# Patient Record
Sex: FEMALE | Race: WHITE
[De-identification: names, ages, dates, MRNs, and addresses within clinical notes are randomized per-mention and may not be internally consistent; named-entity substitution may affect disease eponyms.]

---

## 2024-07-24 ENCOUNTER — TRANSCRIBE ORDERS (OUTPATIENT)
Dept: OTHER | Age: 34
End: 2024-07-24

## 2024-07-24 DIAGNOSIS — J34.3 NASAL TURBINATE HYPERTROPHY: ICD-10-CM

## 2024-07-24 DIAGNOSIS — J34.829 NASAL VALVE COLLAPSE: ICD-10-CM

## 2024-07-24 DIAGNOSIS — J34.2 DEVIATED NASAL SEPTUM: Primary | ICD-10-CM

## 2024-09-30 NOTE — PROGRESS NOTES
Facial Plastic and Reconstructive Surgery Consultation  Department of Otolaryngology  [facility]    Re: Liz Yanez    : 1990    MRN: [id number]    {Greeting_ww (Optional):802451}    IMPRESSION & RECOMMENDATIONS  1.) {LEFT/RIGHT CAPS:943863} nasal obstruction from the following anatomic causes: {Nasal_Exam_Findings_ww:439307}  Other pertinent nasal/facial anatomy: {Nasal_Aesthetics_ww:736866}  Outcomes: NOSE score: *** on 24.  Medical Decision Making (MDM): {Problem_Addressed_MDM_LOS-ww:629809}.   The nasal obstruction results from the anatomic abnormalities described above. I recommend a course of medical therapy using steroid nasal sprays. I have asked Ms. Yanez to follow up with me in 4-6 weeks to update me on her response to medical therapy.    If medical treatment proves ineffective, I would recommend nasal surgery to address the anatomic abnormalities described above.    Rhinoplasty is a medically necessary component of the planned surgical treatment plan to address the components of her nasal obstruction caused by the external nasal deformity.     She has undergone a course of medical treatment and felt minimal to no benefit from this therapy. I recommend the nasal surgery outlined in the care checklist to address her anatomic abnormalities. The patient agrees with this plan and elects to proceed with surgery. We have initiated surgery scheduling.     There are no clear anatomic causes for obstruction found on exam which leads to the consideration of non-anatomic causes for nasal congestion like allergies.    Since she is doing so well on medical therapy, I recommend continuing with medical treatment for up to 6 months and then taper off the nasal sprays. If she has recurrent nasal obstruction and does not want to resume medical therapy, we could reevaluate for surgical treatment at that time. The patient agrees with this plan and will contact us if the medicines become ineffective or if she  wishes to consider surgery further.    For children: We discussed the option of nasal surgery to address her severe obstruction; however, the surgery could effect her facial growth centers. The patient is *** compared to other family members, has an immature facial structure compared to *** and will likely have more facial growth. Because of this, they elected to defer surgical treatment to allow facial growth. In the meantime, I recommend a course of medical therapy using steroid nasal sprays. I have asked the patient and her family to follow up when she turns *** years old for reevaluation.     Care Checklist:  _Nasal steroid trial, then follow up in 4-6 weeks (.wwaftersteroidspray protocol).  _Observe nasal function and nasal cycle.  _Administer NOSE evaluation at every visit.   _Perform nasal endoscopy at next visit to look for a posterior cause of nasal obstruction, if she fails to improve from medical treatment.  _Financial counseling regarding proposed operative plan. After receiving this counseling, the patient should return to clinic for one more visit to confirm they are comfortable with the financial plan and complete the informed consent process and photographs.   _Obtain preoperative photographs if we consider surgery in the future.   _If medical treatment proves ineffective, operative plan includes: {Nasal_Surgeries_CPT-ww:024703}.  _Conchal cartilage harvest and grafting from either or both ears (CPT 08743) and/or possible temporal or mastoid fascia harvest and grafting from either side (CPT 09933) would only be performed if there were unexpected findings during the nasal surgery and additional grafting materials are necessary to rebuild the nose. But preoperatively, the need for graft harvest and additional grafting is unlikely.   _Surgery is expected to only provide a partial benefit in breathing. Nasal steroid spray use and even further allergy treatment may be needed after surgery to obtain an  optimal result.   _Expected outcome: we discussed the expected outcome of a straighter nose (but not perfectly straight) and reduction of the traumatic dorsal hump (but not complete resolution of the hump). Palpable irregularities at the osteotomy sites are expected. The patient accepted this expected outcome.     ***.) Nasal deformity from *** resulting in {Nasal_Aesthetics_ww:751969}.  Medical Decision Making (MDM): {Problem_Addressed_[facility]_LOS-ww:802553}.   This deformity that resulted from trauma can only be corrected surgically with rhinoplasty techniques. Rhinoplasty is medically necessary to restore her to her pretrauma condition.   Care Checklist:  _Schedule 60 minute computer image modification session at our next visit to help understand any proposed external nasal changes.   _Rhinoplasty as described in the operative plan above.     ***.) Allegic rhinitis.  Medical Decision Making (MDM): {Problem_Addressed_[facility]_LOS-ww:407548}.   Allergies likely play a role in her nasal obstruction and that surgery will not improve her allergic rhinitis.   Care Checklist:   _Allergy consultation before considering surgery. Return to clinic after allergy evaluation.   _Expected outcome after any nasal surgery: some degree of nasal obstruction will be present postoperatively due to allergies. You will likely need nasal steroid sprays after a successful nasal surgery and may need ongoing care by an allergist.    {Impression_Recommendations_Additions_ww (Optional):306164}  Background/Connection:   Preferred name = ***  What is most important to know about you as a person? (No medical information) ***    Photographs: {Photos_ww:778907}    It has been a pleasure to participate in the care of Ms. Yanez.     Sincerely,    Porter Dickey MD  Facial Plastic and Reconstructive Surgeon  Department of Otolaryngology  [facility]        INSTRUCTIONS     INSTRUCTIONS:    Nasal steroid trial: use the nasal steroid spray in  each nostril two times a day (usually first thing in the morning and then before going to bed). Please use these medications for at least 4 weeks. Let Dr. Dickey know at your follow up visit whether the steroid nasal spray improved your nasal breathing.   Nasal steroid and nasal saline trial: use the nasal steroid spray in each nostril two times a day (usually first thing in the morning and then before going to bed). Starting 2 hours after taking your morning nasal steroid sprays, then begin washing out your nose out with nasal saline every two hours while awake. Please use these medications for at least 4 weeks. Let Dr. Dickey know at your follow up visit whether these sprays improved your nasal breathing.   Allergy consultation: please see an allergist to learn how allergies contribute to your nasal congestion.  Allergies and surgery: Allergies likely play a role in your nasal congestion. Surgery will not improve these allergies. However, because of the degree of anatomy problems with your nose, it is reasonable to consider surgery to improve the abnormal anatomy. Please understand that even with the most successful surgery, some degree of nasal congestion is expected to be present after surgery because of these allergies and you will need an allergist to help you address the allergic component of your nasal congestion. If you would like to learn more about the allergic component of your congestion before any surgery, please see an allergist.   Observe your nasal cycle: The nasal cycle refers to the normal cycle of the nose with alternating stuffiness between the sides of the nose. Most people experience this alternating stuffiness but never notice any troubles breathing through their nose because one side is always open and able to breathe comfortably. For example, a person at the beginning of the day may notice their left nasal passage is stuffy but they breathe comfortably through the right nasal passage. Later  "in the day, their nose would have  cycled  and now their alternate nostril- the right nostril- is stuffy and the left nostril is open and they are breathing comfortably through the left side. Even later in the day, the stuffiness may have switched back to the left side.  This alternating stuffiness between the sides of the nose known as the nasal cycle and it is normal. This is important to understand because breathing through both nostrils at the same time is often not possible even after surgery.  The best breathing result possible may be that you breathe comfortably through one side of your nose at a time and it switches back and forth between the sides. Please observe your nasal breathing and see how this fits with the normal nasal cycle.   Tobacco Use: all smoking should stop for 6 weeks before surgery to decrease the risk of healing problems after surgery. Even if smoking cessation is successful, the risk of healing problems after surgery is higher because of this prior tobacco use. Ask your primary care provider for help with quitting smoking. [facility] also [place] resources to help quit. Feel free to ask us for those resources when you are ready. We will discuss your progress towards this goal at our next visit.    Informed consent for nasal surgery: You read and signed the informed consent for nasal surgery. We have given you a copy of that form to take with you. If you did not receive a copy of that document, please let us know.   Please contact us at [phone] if you have questions or if we can be of service.      HPI     Ms. Yanez is an 34 year old-year-old female who presents with nasal congestion.     She first noticed nasal obstruction or congestion *** ago.    {LEFT_RIGHT_BILATERAL:28282}. Is the congestion worse on one side or the other?  {No_YES:39523::\"No\"}. Allergic symptoms?    {No_YES:70088::\"No\"}. Previous nasal surgery? If yes, what surgery and when? {Not Applicable or free text:468260::\"N/A\"} " "  {No_YES:37115::\"No\"}. Previous nasal trauma? If yes, what happened and when? {Not Applicable or free text:668695::\"N/A\"}     {No_YES:46102::\"No\"}. Previous nasal steroid spray treatment?  If yes, what was name of the spray? {Not Applicable or free text:414504::\"N/A\"}   How long did you use the spray? {Not Applicable or free text:487554::\"N/A\"}   Did the sprays help? {Not Applicable or free text:738797::\"N/A\"}     {No_YES:63878::\"No\"}. Do you have any concerns about the appearance of your nose?   If any concerns are present, document their words: {Not Applicable or free text:932291::\"N/A\"}     {No_YES:11893::\"No\"}. Concern for sleep apnea?    Other pertinent history: {NONE:105933}      PAST MEDICAL HISTORY     No past medical history on file.  Additional significant PMH not noted above: {NONE:666760}    PAST SURGICAL HISTORY     No past surgical history on file.  Additional significant PSH not noted above: {NONE:152649}    ALLERGIES     Patient has no allergy information on record.    MEDICATIONS     No current outpatient medications on file.     {No_YES:30562::\"No\"}. Do you take any blood thinners?      PHYSICAL EXAM   A standard nasal exam was performed. Pertinent findings are documented in the impression and recommendations section above. Pertinent negative findings on exam include:  {No_YES:61739::\"No\"}: septal perforation  {No_YES:93550::\"No\"}: nasal polyps  {No_YES:50236::\"No\"}: nasal mass  {No_YES:45309::\"No\"}: bleeding  {No_YES:00658::\"No\"}: purulence    Standard nasal and facial aesthetics were observed including skin quality, projection, rotation, forehead and chin position with the exceptions noted here or in the impression and recommendations section: asymmetries of the nostrils and face are present.     Modified [place] maneuver: the patient reported {NO/SOME/SIGNIFICANT:340843} improvement in breathing with {Cottle_Action_ww:549740} of the {Cottle_Location_ww:473566}.    Remainder of physical " "exam:  General: {General_Exam_ww:803584::\"Pleasant affect, normal ability to communicate\"}  Oral cavity/oropharynx: {OC_OP_Exam_ww:614717::\"Normal mouth opening. No OC/OP masses.\"}  Respiratory: {Airway_General_Exam_ww:056723::\"NAD, no stridor, voice strong\"}      PROCEDURE: NASAL ENDOSCOPY   Indications: examine for posterior nasal cavity causes of nasal obstruction.  The nose was topically decongested and anesthestized with a compounded spray of 3% lidocaine and 0.25% phenylephrine through both nostrils. The nasal endoscope was passed under endoscopic vision. Normal middle turbinate architecture. No polyps or purulence. No septal perforation. No significant adenoid tissue. Eustachian tube orifices clear. No masses or lesions.***No posterior nasal cause of obstruction.       PREOPERATIVE COUNSELING   An extensive preoperative discussion was held. The {patient_family_ww:324142::\"patient\"} read, understood and signed the document entitled \"Informed Consent for Nasal Surgery\" and voiced understanding of the risks, benefits, alternatives and limitations of the procedure. We discussed the possibility of persistent nasal airway obstruction. Any additional expected outcomes that were communicated are documented in the impression and recommendations section. All questions were answered to the satisfaction of the {patient_family_ww:959575::\"patient\"}.       SIGNATURE   MA transcription of patient information: I reviewed the information the MA transcribed from the patient that is documented below.  {INTERPRETERSTATEMENT (Optional):62388}  {Time_Statement_ww (Optional):316743}  Signature: {Signature_wew:518735::\"Porter Dickey MD\"}      MA/RN Documentation Section     QUESTIONNAIRE     Background/Connection:   Preferred name = ***  What is most important to know about you as a person? (No medical information) ***      When did you first notice your nose problem? *** ago.    {LEFT/RIGHT CAPS:495244}. Is the congestion worse " "on one side or the other?     Is the congestion constant or intermittent? {costant or intermittent:112746}.    Prior nasal surgery or trauma:  {No_YES:36833::\"No\"}. Have you had nose surgery before?   {No_YES:40943::\"No\"}. Have you broken your nose before?   If yes, when? {Not Applicable or free text:775381::\"N/A\"}     Prior nasal treatment:  {No_YES:12521::\"No\"}. Have you tried any nose sprays before?   If yes, what was name of the spray? {Not Applicable or free text:340796::\"N/A\"}   When did you start using it? {Not Applicable or free text:192827::\"N/A\"}   How long did you use the spray? {Not Applicable or free text:791373::\"N/A\"}   Did the sprays help? {Nasal_Spray_Effects_ww:396362}  {No_YES:11129::\"No\"}. Have you tried breathe-right strips?    Nasal appearance:   {No_YES:59850::\"No\"}. Do you have any concerns about the appearance of your nose?   If any concerns are present, document their words: {Not Applicable or free text:469832::\"N/A\"}     Allergies:  {No_YES:01858::\"No\"}. Do you have a history of allergies?   {No_YES:55543::\"No\"}. Do you have frequent sneezing?  {No_YES:45017::\"No\"}. Do you have frequent runny nose?   {No_YES:68728::\"No\"}. Do you have watery or itchy eyes?     Sinusitis:  {No_YES:62893::\"No\"}. Do you get sinus infections? How many infections per year? {Not Applicable or free text:987032::\"N/A\"}   {No_YES:07602::\"No\"}. Do you have facial pain or pressure?   {No_YES:62011::\"No\"}. Difficulty with smell or reduced sense of smell?  {No_YES:86305::\"No\"}. Post nasal drip or runny nose down the back of your throat?  {No_YES:88404::\"No\"}. Tooth pain?  {No_YES:48121::\"No\"}. Fevers?    Sleep apnea: (STOP test)  {No_YES:71728::\"No\"}. Snoring: Do you snore loudly (louder than talking or loud enough to be heard through closed doors)?  {No_YES:54654::\"No\"}. Tired: Do you often feel tired, fatigued, or sleepy during daytime?  {No_YES:72574::\"No\"}. Observed: Has anyone observed you stop breathing during " "your sleep?  {No_YES:49878::\"No\"}. Blood Pressure: Do you have or are you being treated for high blood pressure?       Nasal Obstruction Symptom Evaluation (NOSE QUESTIONNAIRE):   [Administer the paper survey to the patient called the Nasal Obstruction Symptom Evaluation (NOSE QUESTIONNAIRE), and record the results below:]    {NOSE_Score-ww:212270}: Nasal congestion or stuffiness?  {NOSE_Score-ww:033099}: Nasal blockage or obstruction?  {NOSE_Score-ww:375948}: Trouble breathing through her nose?  {NOSE_Score-ww:106040}: Trouble sleeping?  {NOSE_Score-ww:372806}: Inability to get enough air through her nose during exercise or exertion?    Add up the NOSE Score:  {Add_Up_NOSE_Score_ww:711532} = Total NOSE score      SURGICAL RISK FACTORS   Do you currently have or have you ever had in the past:  {No_YES:99815::\"No\"}. Do you currently smoke?  {No_YES:44282::\"No\"}. Problems with sedation, anesthesia, or surgery.  {No_YES:12201::\"No\"}. Use blood thinners.  {No_YES:19249::\"No\"}. Diabetes.  {No_YES:55489::\"No\"}. Any heart problems.   {No_YES:90806::\"No\"}. Chest pain.  {No_YES:50538::\"No\"}. Do you get short of breath if you climb up 2 flights of stairs?  {No_YES:46451::\"No\"}. A pacemaker.  {No_YES:79309::\"No\"}. Any implanted device in your body.   {No_YES:05136::\"No\"}. Any lung problems.   {No_YES:02166::\"No\"}. Any kidney problems.   {No_YES:64641::\"No\"}. Any liver problems.   {No_YES:71562::\"No\"}. Hepatitis.    {No_YES:93124::\"No\"}. HIV or AIDS.  {No_YES:40994::\"No\"}. Chronic pain anywhere.  {No_YES:32808::\"No\"}. Problems with excessive bleeding or a bleeding disorder.  {No_YES:76078::\"No\"}. Problems with blood clots or a clotting disorder.   {No_YES:25724::\"No\"}. Sleep apnea or sleep with a CPAP machine.  {No_YES:43666::\"No\"}. Any struggles with addiction?   {No_YES:97464::\"No\"}. Family history of excessive bleeding or a bleeding disorder?    {No_YES:54378::\"No\"}. Family history of blood clots or a clotting " "disorder?  {No_YES:95418::\"No\"}. Excessive scarring.   {No_YES:67197::\"No\"}. Isotretinoin (Accutaine) in the last year.  {No_YES:31726::\"No\"}. Difficulty urinating.   {No_YES:61055::\"No\"}. Anxiety.   {No_YES:40849::\"No\"}. Depression.   {No_YES:65577::\"No\"}. Allergies to medicines.  {No_YES:54128::\"No\"}. Joint replacement.  {No_YES:45805::\"No\"}. Have you been told you need to take antibiotics before surgery?    Signature: Michael Calderon"

## 2024-10-10 ENCOUNTER — OFFICE VISIT (OUTPATIENT)
Dept: OTOLARYNGOLOGY | Facility: CLINIC | Age: 34
End: 2024-10-10
Payer: COMMERCIAL

## 2025-03-17 ENCOUNTER — PATIENT OUTREACH (OUTPATIENT)
Dept: OTOLARYNGOLOGY | Facility: CLINIC | Age: 35
End: 2025-03-17
Payer: COMMERCIAL

## 2025-03-17 NOTE — TELEPHONE ENCOUNTER
Called and spoke with patient regarding her inability to schedule with Dr. Dickey again given that she has canceled 7 consults with him as well as no showed 1 appointment. Discussed with patient that we are unable to reschedule another appt given her inability to follow through on one of the last 8 appointments scheduled.     Patient indicates that she is very frustrated by this information but she indicates that she has had a referral sent to another clinic and she will proceed with care there. She then proceeded to hang up on writer.     We will not schedule this patient in clinic for an additional consult at this time with Dr. Dickey. Permanent comment updated in chart.     Tiffany De La O, RN, BSN  ENT clinic Manager